# Patient Record
Sex: FEMALE | Race: BLACK OR AFRICAN AMERICAN | NOT HISPANIC OR LATINO | URBAN - METROPOLITAN AREA
[De-identification: names, ages, dates, MRNs, and addresses within clinical notes are randomized per-mention and may not be internally consistent; named-entity substitution may affect disease eponyms.]

---

## 2019-05-07 ENCOUNTER — EMERGENCY (EMERGENCY)
Facility: HOSPITAL | Age: 64
LOS: 1 days | Discharge: HOME | End: 2019-05-07
Attending: EMERGENCY MEDICINE | Admitting: EMERGENCY MEDICINE
Payer: MEDICARE

## 2019-05-07 VITALS
OXYGEN SATURATION: 100 % | HEART RATE: 75 BPM | DIASTOLIC BLOOD PRESSURE: 94 MMHG | RESPIRATION RATE: 16 BRPM | SYSTOLIC BLOOD PRESSURE: 158 MMHG

## 2019-05-07 VITALS
TEMPERATURE: 98 F | DIASTOLIC BLOOD PRESSURE: 104 MMHG | SYSTOLIC BLOOD PRESSURE: 201 MMHG | HEART RATE: 64 BPM | OXYGEN SATURATION: 100 % | RESPIRATION RATE: 16 BRPM

## 2019-05-07 LAB
ALBUMIN SERPL ELPH-MCNC: 4.8 G/DL — SIGNIFICANT CHANGE UP (ref 3.3–5)
ALP SERPL-CCNC: 61 U/L — SIGNIFICANT CHANGE UP (ref 40–120)
ALT FLD-CCNC: 16 U/L — SIGNIFICANT CHANGE UP (ref 4–33)
ANION GAP SERPL CALC-SCNC: 13 MMO/L — SIGNIFICANT CHANGE UP (ref 7–14)
AST SERPL-CCNC: 22 U/L — SIGNIFICANT CHANGE UP (ref 4–32)
BASOPHILS # BLD AUTO: 0 K/UL — SIGNIFICANT CHANGE UP (ref 0–0.2)
BASOPHILS NFR BLD AUTO: 0 % — SIGNIFICANT CHANGE UP (ref 0–2)
BILIRUB SERPL-MCNC: 0.3 MG/DL — SIGNIFICANT CHANGE UP (ref 0.2–1.2)
BUN SERPL-MCNC: 23 MG/DL — SIGNIFICANT CHANGE UP (ref 7–23)
CALCIUM SERPL-MCNC: 10 MG/DL — SIGNIFICANT CHANGE UP (ref 8.4–10.5)
CHLORIDE SERPL-SCNC: 99 MMOL/L — SIGNIFICANT CHANGE UP (ref 98–107)
CO2 SERPL-SCNC: 29 MMOL/L — SIGNIFICANT CHANGE UP (ref 22–31)
CREAT SERPL-MCNC: 1.13 MG/DL — SIGNIFICANT CHANGE UP (ref 0.5–1.3)
EOSINOPHIL # BLD AUTO: 0.03 K/UL — SIGNIFICANT CHANGE UP (ref 0–0.5)
EOSINOPHIL NFR BLD AUTO: 0.5 % — SIGNIFICANT CHANGE UP (ref 0–6)
GLUCOSE SERPL-MCNC: 121 MG/DL — HIGH (ref 70–99)
HCT VFR BLD CALC: 42.7 % — SIGNIFICANT CHANGE UP (ref 34.5–45)
HGB BLD-MCNC: 13.6 G/DL — SIGNIFICANT CHANGE UP (ref 11.5–15.5)
IMM GRANULOCYTES NFR BLD AUTO: 0.3 % — SIGNIFICANT CHANGE UP (ref 0–1.5)
LYMPHOCYTES # BLD AUTO: 2.21 K/UL — SIGNIFICANT CHANGE UP (ref 1–3.3)
LYMPHOCYTES # BLD AUTO: 35.5 % — SIGNIFICANT CHANGE UP (ref 13–44)
MCHC RBC-ENTMCNC: 29.8 PG — SIGNIFICANT CHANGE UP (ref 27–34)
MCHC RBC-ENTMCNC: 31.9 % — LOW (ref 32–36)
MCV RBC AUTO: 93.6 FL — SIGNIFICANT CHANGE UP (ref 80–100)
MONOCYTES # BLD AUTO: 0.37 K/UL — SIGNIFICANT CHANGE UP (ref 0–0.9)
MONOCYTES NFR BLD AUTO: 5.9 % — SIGNIFICANT CHANGE UP (ref 2–14)
NEUTROPHILS # BLD AUTO: 3.59 K/UL — SIGNIFICANT CHANGE UP (ref 1.8–7.4)
NEUTROPHILS NFR BLD AUTO: 57.8 % — SIGNIFICANT CHANGE UP (ref 43–77)
NRBC # FLD: 0 K/UL — SIGNIFICANT CHANGE UP (ref 0–0)
PLATELET # BLD AUTO: 154 K/UL — SIGNIFICANT CHANGE UP (ref 150–400)
PMV BLD: 11.6 FL — SIGNIFICANT CHANGE UP (ref 7–13)
POTASSIUM SERPL-MCNC: 3.8 MMOL/L — SIGNIFICANT CHANGE UP (ref 3.5–5.3)
POTASSIUM SERPL-SCNC: 3.8 MMOL/L — SIGNIFICANT CHANGE UP (ref 3.5–5.3)
PROT SERPL-MCNC: 8 G/DL — SIGNIFICANT CHANGE UP (ref 6–8.3)
RBC # BLD: 4.56 M/UL — SIGNIFICANT CHANGE UP (ref 3.8–5.2)
RBC # FLD: 12.4 % — SIGNIFICANT CHANGE UP (ref 10.3–14.5)
SODIUM SERPL-SCNC: 141 MMOL/L — SIGNIFICANT CHANGE UP (ref 135–145)
TROPONIN T, HIGH SENSITIVITY: < 6 NG/L — SIGNIFICANT CHANGE UP (ref ?–14)
WBC # BLD: 6.22 K/UL — SIGNIFICANT CHANGE UP (ref 3.8–10.5)
WBC # FLD AUTO: 6.22 K/UL — SIGNIFICANT CHANGE UP (ref 3.8–10.5)

## 2019-05-07 PROCEDURE — 71275 CT ANGIOGRAPHY CHEST: CPT | Mod: 26

## 2019-05-07 PROCEDURE — 99285 EMERGENCY DEPT VISIT HI MDM: CPT

## 2019-05-07 PROCEDURE — 74177 CT ABD & PELVIS W/CONTRAST: CPT | Mod: 26

## 2019-05-07 RX ORDER — MORPHINE SULFATE 50 MG/1
2 CAPSULE, EXTENDED RELEASE ORAL ONCE
Qty: 0 | Refills: 0 | Status: DISCONTINUED | OUTPATIENT
Start: 2019-05-07 | End: 2019-05-07

## 2019-05-07 RX ORDER — LABETALOL HCL 100 MG
100 TABLET ORAL ONCE
Qty: 0 | Refills: 0 | Status: DISCONTINUED | OUTPATIENT
Start: 2019-05-07 | End: 2019-05-07

## 2019-05-07 RX ORDER — HYDRALAZINE HCL 50 MG
10 TABLET ORAL ONCE
Qty: 0 | Refills: 0 | Status: COMPLETED | OUTPATIENT
Start: 2019-05-07 | End: 2019-05-07

## 2019-05-07 RX ADMIN — Medication 10 MILLIGRAM(S): at 17:42

## 2019-05-07 RX ADMIN — MORPHINE SULFATE 2 MILLIGRAM(S): 50 CAPSULE, EXTENDED RELEASE ORAL at 17:42

## 2019-05-07 NOTE — ED PROVIDER NOTE - OBJECTIVE STATEMENT
62 y/o female no pmh c/o chest pain and dizziness x today. Pt admits to waking up with dizziness, described as lightheadedness. Pt states that symptoms were constant, no aggravating or relieving factors. Pt then started to develop left sided chest pain. Pt then called family who brought her to urgent care. Pt had ekg and cxr and was sent to ER. Pt denies sob, diaphoresis, n/v/d, numbness, tingling, weakness, syncope, neck pain, fever or chills. 62 y/o female no pmh c/o chest pain and dizziness x today. Pt admits to waking up with dizziness, described as lightheadedness. Pt states that symptoms were constant, no aggravating or relieving factors. Pt then started to develop left sided chest pain. Pt then called family who brought her to urgent care. Pt had ekg and cxr and was sent to ER. Pt denies sob, diaphoresis, n/v/d, numbness, tingling, weakness, syncope, neck pain, fever or chills. Pt says pain was sharp and  severe L ant chest, possibly some assoc upper back pain.

## 2019-05-07 NOTE — ED ADULT NURSE NOTE - OBJECTIVE STATEMENT
Pt arrived to room 21, ambulatory, accompanied by niece. Pt is A&Ox3, pleasant, calm and cooperative. Pt complains of intermittent sharp midsternal chest pain that radiates to mid back. Pt states chest pain started this morning accompanied with SOB, lightheadedness and dizziness. Pt went to [facility] and was told to come to ED for further assessment. Pt denies n/v/d, weakness, cough. Pt denies any medical hx. 20 G IV access on left AC, labs drawn and sent, placed on a cardiac monitor. Will continue to monitor. Pt arrived to room 21, ambulatory, accompanied by niece. Pt is A&Ox3, pleasant, calm and cooperative. Pt complains of intermittent sharp midsternal chest pain that radiates to mid back. Pt states chest pain started this morning accompanied with SOB, lightheadedness and dizziness. Pt went to [facility] and was told to come to ED for further assessment. Pt denies n/v/d, weakness, cough. Pt denies any medical hx. 20 G IV access on left AC, labs drawn and sent, placed on a cardiac monitor, NSR, Bp elevated 214/102, provider made aware. Will continue to monitor.

## 2019-05-07 NOTE — ED ADULT TRIAGE NOTE - CHIEF COMPLAINT QUOTE
c/o intermittent midsternal chest pain since this morning. radiates through to back. sent in from urgent care for workup. denies any medical hx.

## 2019-05-07 NOTE — ED PROVIDER NOTE - PROGRESS NOTE DETAILS
Benito URIOSTEGUI - patient received on sign out. If dissection study negative okay for patient ot go home. Patient feels improved. tolerating po. ambulating without difficulty. will dc home with close follow up in 1 - 2 days. Patient aware and agrees with plan.

## 2019-05-07 NOTE — ED PROVIDER NOTE - ATTENDING CONTRIBUTION TO CARE
ED Attending (Stephanie URIOSTEGUI): I have personally performed a face to face bedside history and physical examination of this patient. I have discussed the history, examination, assessment and plan of management with the Physician Assistant. My findings include: 62 y/o female no pmh c/o chest pain and dizziness x today. Pt admits to waking up with dizziness, described as lightheadedness. Pt states that symptoms were constant, no aggravating or relieving factors. Pt then started to develop left sided chest pain. Pt then called family who brought her to urgent care. Pt had ekg and cxr and was sent to ER. Pt denies sob, diaphoresis, n/v/d, numbness, tingling, weakness, syncope, neck pain, fever or chills. Pt says pain was sharp and  severe L ant chest, possibly some assoc upper back pain.  On exam: very high BP noted, otherwise VSS lungs, heart, pulses, abdomen, neuro, extremities, skin, all normal on exam. Plan: labs troponin EKG CXR consider CTA, BP control

## 2019-05-07 NOTE — ED PROVIDER NOTE - CLINICAL SUMMARY MEDICAL DECISION MAKING FREE TEXT BOX
64 y/o female w/ chest pain and dizziness- BP on LUE 200s/100s, RUE 160s/70s, concern for dissection- will check labs, ekg, CTA, reassess

## 2019-05-07 NOTE — ED ADULT NURSE REASSESSMENT NOTE - NS ED NURSE REASSESS COMMENT FT1
Pt resting in room on stretcher. Pt complaining of sudden intermittent sharp left chest pain, underneath breast, nonradiating, 7/10 pain level. VS repeated and documented. Pt family members at bedside. will notify provider. Continue to monitor.

## 2019-05-07 NOTE — ED PROVIDER NOTE - GASTROINTESTINAL, MLM
Abdomen soft, non-tender, no guarding.
respirations non-labored/clear to auscultation bilaterally/no intercostal retractions/good air movement/no rhonchi/no wheezes/normal/no rales/airway patent/breath sounds equal

## 2019-05-07 NOTE — ED PROVIDER NOTE - NSFOLLOWUPINSTRUCTIONS_ED_ALL_ED_FT
1) Follow up with your doctor in 1 week for continued blood pressure control.     2) Return to the ER immediately for new or worsening symptoms including vomiting, uncontrollable chest pain or other issues.     3) Take all your home meds as prescribed.

## 2019-05-08 ENCOUNTER — APPOINTMENT (OUTPATIENT)
Dept: CARDIOLOGY | Facility: CLINIC | Age: 64
End: 2019-05-08
Payer: MEDICARE

## 2019-05-08 VITALS
HEART RATE: 61 BPM | BODY MASS INDEX: 25.83 KG/M2 | WEIGHT: 155 LBS | HEIGHT: 65 IN | SYSTOLIC BLOOD PRESSURE: 176 MMHG | DIASTOLIC BLOOD PRESSURE: 84 MMHG | OXYGEN SATURATION: 100 %

## 2019-05-08 VITALS — DIASTOLIC BLOOD PRESSURE: 90 MMHG | SYSTOLIC BLOOD PRESSURE: 152 MMHG

## 2019-05-08 DIAGNOSIS — I10 ESSENTIAL (PRIMARY) HYPERTENSION: ICD-10-CM

## 2019-05-08 DIAGNOSIS — R06.02 SHORTNESS OF BREATH: ICD-10-CM

## 2019-05-08 DIAGNOSIS — R07.89 OTHER CHEST PAIN: ICD-10-CM

## 2019-05-08 PROBLEM — Z00.00 ENCOUNTER FOR PREVENTIVE HEALTH EXAMINATION: Status: ACTIVE | Noted: 2019-05-08

## 2019-05-08 PROCEDURE — 99204 OFFICE O/P NEW MOD 45 MIN: CPT

## 2019-05-08 PROCEDURE — 93306 TTE W/DOPPLER COMPLETE: CPT

## 2019-05-08 RX ORDER — LISINOPRIL 30 MG/1
TABLET ORAL
Refills: 0 | Status: ACTIVE | COMMUNITY

## 2019-05-08 NOTE — REASON FOR VISIT
[Consultation] : a consultation regarding [Chest Pain] : chest pain [Hypertension] : hypertension [FreeTextEntry1] : shortness of breath on exertion.

## 2019-05-08 NOTE — PHYSICAL EXAM
[Normal Appearance] : normal appearance [Well Groomed] : well groomed [General Appearance - Well Developed] : well developed [No Deformities] : no deformities [General Appearance - Well Nourished] : well nourished [General Appearance - In No Acute Distress] : no acute distress [Eyelids - No Xanthelasma] : the eyelids demonstrated no xanthelasmas [Normal Conjunctiva] : the conjunctiva exhibited no abnormalities [No Oral Pallor] : no oral pallor [Normal Oral Mucosa] : normal oral mucosa [No Oral Cyanosis] : no oral cyanosis [Heart Sounds] : normal S1 and S2 [Heart Rate And Rhythm] : heart rate and rhythm were normal [Arterial Pulses Normal] : the arterial pulses were normal [Edema] : no peripheral edema present [Bowel Sounds] : normal bowel sounds [Auscultation Breath Sounds / Voice Sounds] : lungs were clear to auscultation bilaterally [Respiration, Rhythm And Depth] : normal respiratory rhythm and effort [Abdomen Tenderness] : non-tender [Abdomen Soft] : soft [Abnormal Walk] : normal gait [Skin Color & Pigmentation] : normal skin color and pigmentation [Cyanosis, Localized] : no localized cyanosis [Nail Clubbing] : no clubbing of the fingernails [Oriented To Time, Place, And Person] : oriented to person, place, and time [] : no rash [Skin Turgor] : normal skin turgor [No Anxiety] : not feeling anxious [Impaired Insight] : insight and judgment were intact [FreeTextEntry1] : 1/4 EDM in 2nd ICS

## 2019-05-08 NOTE — REVIEW OF SYSTEMS
[Headache] : headache [Shortness Of Breath] : shortness of breath [Chest Pain] : chest pain [Negative] : Psychiatric [Chills] : no chills [Fever] : no fever [Feeling Fatigued] : not feeling fatigued [Palpitations] : no palpitations [Lower Ext Edema] : no extremity edema [Easy Bleeding] : no tendency for easy bleeding [Easy Bruising] : no tendency for easy bruising

## 2019-05-08 NOTE — DISCUSSION/SUMMARY
[FreeTextEntry1] : In a summary Natalie Alvarado is a middle aged female with hypertension, new diagnosis, PCP started on Lisinopril which she will start from today. Eats healthy. BP difference in arms, Echo done showed normal LV systolic function and normal aortic root size. Follow up in 4 weeks for BP. If shortness of breath on exertion does not improve after BP controlled will do stress test. Chest pains does not seem cardiac. Explained Ms. Rose in detail.

## 2019-05-08 NOTE — HISTORY OF PRESENT ILLNESS
[FreeTextEntry1] : Natalie Alvarado is a 63 year old female with newly diagnosed hypertension comes for cardiac evaluation. Complaining of random onset of sharp chest pains mild in intensity, nonradiating which is relieved by itself in few seconds of few months duration. Went to Urgent care and was told muscular. Also complaining of mild shortness of breath on exertion which is relieved with rest in few minutes of few months duration. No palpitations.

## 2019-06-05 ENCOUNTER — APPOINTMENT (OUTPATIENT)
Dept: CARDIOLOGY | Facility: CLINIC | Age: 64
End: 2019-06-05